# Patient Record
Sex: FEMALE | URBAN - METROPOLITAN AREA
[De-identification: names, ages, dates, MRNs, and addresses within clinical notes are randomized per-mention and may not be internally consistent; named-entity substitution may affect disease eponyms.]

---

## 2018-08-13 ENCOUNTER — APPOINTMENT (RX ONLY)
Dept: URBAN - METROPOLITAN AREA CLINIC 29 | Facility: CLINIC | Age: 83
Setting detail: DERMATOLOGY
End: 2018-08-13

## 2018-08-13 DIAGNOSIS — L40.0 PSORIASIS VULGARIS: ICD-10-CM

## 2018-08-13 DIAGNOSIS — Z79.899 OTHER LONG TERM (CURRENT) DRUG THERAPY: ICD-10-CM

## 2018-08-13 DIAGNOSIS — Z71.89 OTHER SPECIFIED COUNSELING: ICD-10-CM

## 2018-08-13 DIAGNOSIS — L29.8 OTHER PRURITUS: ICD-10-CM

## 2018-08-13 DIAGNOSIS — L81.0 POSTINFLAMMATORY HYPERPIGMENTATION: ICD-10-CM

## 2018-08-13 DIAGNOSIS — L29.89 OTHER PRURITUS: ICD-10-CM

## 2018-08-13 PROCEDURE — 99202 OFFICE O/P NEW SF 15 MIN: CPT

## 2018-08-13 PROCEDURE — ? HIGH RISK MEDICATION MONITORING

## 2018-08-13 PROCEDURE — ? COUNSELING

## 2018-08-13 PROCEDURE — ? PRESCRIPTION

## 2018-08-13 PROCEDURE — ? SUNSCREEN RECOMMENDATIONS

## 2018-08-13 PROCEDURE — ? TREATMENT REGIMEN

## 2018-08-13 RX ORDER — FLUOCINOLONE ACETONIDE 0.11 MG/ML
1 OIL TOPICAL QD
Qty: 1 | Refills: 4 | Status: ERX | COMMUNITY
Start: 2018-08-13

## 2018-08-13 RX ORDER — FLUOCINONIDE 0.5 MG/ML
1 SOLUTION TOPICAL TWICE DAILY
Qty: 1 | Refills: 4 | Status: ERX | COMMUNITY
Start: 2018-08-13

## 2018-08-13 RX ADMIN — FLUOCINONIDE 1: 0.5 SOLUTION TOPICAL at 20:54

## 2018-08-13 RX ADMIN — FLUOCINOLONE ACETONIDE 1: 0.11 OIL TOPICAL at 20:51

## 2018-08-13 ASSESSMENT — LOCATION DETAILED DESCRIPTION DERM
LOCATION DETAILED: POSTERIOR MID-PARIETAL SCALP
LOCATION DETAILED: RIGHT SUPERIOR PARIETAL SCALP

## 2018-08-13 ASSESSMENT — LOCATION SIMPLE DESCRIPTION DERM
LOCATION SIMPLE: SCALP
LOCATION SIMPLE: POSTERIOR SCALP

## 2018-08-13 ASSESSMENT — LOCATION ZONE DERM: LOCATION ZONE: SCALP

## 2018-08-13 NOTE — PROCEDURE: TREATMENT REGIMEN
Action 3: Continue
Plan: denies hx of depression- pt is being treated for anxiety; discussed if any mood changes to d/c otezla and call office\\nRequesting recent blood work from PCP to check renal function, if labs normal laying may start Otezla starter pack
Initiate Regimen: Derma smoothe oil to scalp at night let sit and rinse off in the AM. For two weeks on two weeks off \\nFluocinonide solution to scalp twice daily x two weeks on two weeks off \\nTSAL shampoo otc wash scalp 3x weekly
Detail Level: Zone
Samples Given: Otezla starter pack \\nLot number -[id number]\\nExpiration 05/2019